# Patient Record
Sex: FEMALE | Race: WHITE | NOT HISPANIC OR LATINO | URBAN - METROPOLITAN AREA
[De-identification: names, ages, dates, MRNs, and addresses within clinical notes are randomized per-mention and may not be internally consistent; named-entity substitution may affect disease eponyms.]

---

## 2017-03-28 ENCOUNTER — OUTPATIENT (OUTPATIENT)
Dept: OUTPATIENT SERVICES | Facility: HOSPITAL | Age: 47
LOS: 1 days | End: 2017-03-28

## 2025-03-17 NOTE — ASU PATIENT PROFILE, ADULT - NSICDXPASTSURGICALHX_GEN_ALL_CORE_FT
PAST SURGICAL HISTORY:  H/O knee surgery     H/O laparoscopic adjustable gastric banding     History of removal of laparoscopic gastric banding device

## 2025-03-17 NOTE — ASU PATIENT PROFILE, ADULT - NSICDXPASTMEDICALHX_GEN_ALL_CORE_FT
PAST MEDICAL HISTORY:  Herniated cervical disc     Hyperlipidemia     Hypothyroid     Rheumatoid arthritis

## 2025-03-17 NOTE — ASU PATIENT PROFILE, ADULT - CAREGIVER
Onset:  1700 last night    Location/Description:  States she is having Wagoner Gonzalez contractions since yesterday around 1700. States she cannot time them because they are inconsistent. No abd pain. Had back pain while lying down and that has gone away. No vag bleeding or leakage of fluid.  +vaginal d/c that has increased since yesterday and is white in color. No fever. No hand or face edema. +fetal movement. Advised to FU within the next 1-2 days. Encouraged water and rest and to star counting Lexie Shad for FU. Verbalizes understanding care advice and to call back with further questions/concerns or if any of the above s/sx develop. Pain Scale (1-10), 10 highest:  0    LMP: Patient's last menstrual period was 2018 (exact date).   EDC:  3/15/19  Gestational Age:  34w5d  Blood Type: B Pos  OB History:   Obstetric History       T1      L1     SAB0   TAB0   Ectopic0   Molar0   Multiple0   Live Births1            Vaginal/C Section:  Plan C Section  Group B Strep (pos or neg):  na  History of previous Labor & Delivery:  CS-LTran  Recent Care:  19  OBGYN    Reason for Disposition  â¢ Increase in vaginal discharge    Protocols used: PREGNANCY - LABOR - [id number] Yes

## 2025-03-18 ENCOUNTER — INPATIENT (INPATIENT)
Facility: HOSPITAL | Age: 55
LOS: 0 days | Discharge: HOME | End: 2025-03-19
Attending: STUDENT IN AN ORGANIZED HEALTH CARE EDUCATION/TRAINING PROGRAM | Admitting: STUDENT IN AN ORGANIZED HEALTH CARE EDUCATION/TRAINING PROGRAM
Payer: COMMERCIAL

## 2025-03-18 VITALS
HEIGHT: 62 IN | RESPIRATION RATE: 16 BRPM | OXYGEN SATURATION: 100 % | HEART RATE: 57 BPM | WEIGHT: 149.03 LBS | SYSTOLIC BLOOD PRESSURE: 124 MMHG | DIASTOLIC BLOOD PRESSURE: 79 MMHG | TEMPERATURE: 98 F

## 2025-03-18 DIAGNOSIS — Z98.890 OTHER SPECIFIED POSTPROCEDURAL STATES: Chronic | ICD-10-CM

## 2025-03-18 DIAGNOSIS — Z98.84 BARIATRIC SURGERY STATUS: Chronic | ICD-10-CM

## 2025-03-18 DIAGNOSIS — Z41.1 ENCOUNTER FOR COSMETIC SURGERY: ICD-10-CM

## 2025-03-18 LAB
HCT VFR BLD CALC: 35.7 % — SIGNIFICANT CHANGE UP (ref 34.5–45)
HGB BLD-MCNC: 12.4 G/DL — SIGNIFICANT CHANGE UP (ref 11.5–15.5)
MCHC RBC-ENTMCNC: 30.5 PG — SIGNIFICANT CHANGE UP (ref 27–34)
MCHC RBC-ENTMCNC: 34.7 G/DL — SIGNIFICANT CHANGE UP (ref 32–36)
MCV RBC AUTO: 87.9 FL — SIGNIFICANT CHANGE UP (ref 80–100)
NRBC # BLD AUTO: 0 K/UL — SIGNIFICANT CHANGE UP (ref 0–0)
NRBC # FLD: 0 K/UL — SIGNIFICANT CHANGE UP (ref 0–0)
NRBC BLD AUTO-RTO: 0 /100 WBCS — SIGNIFICANT CHANGE UP (ref 0–0)
PLATELET # BLD AUTO: 172 K/UL — SIGNIFICANT CHANGE UP (ref 150–400)
RBC # BLD: 4.06 M/UL — SIGNIFICANT CHANGE UP (ref 3.8–5.2)
RBC # FLD: 12.3 % — SIGNIFICANT CHANGE UP (ref 10.3–14.5)
WBC # BLD: 3.65 K/UL — LOW (ref 3.8–10.5)
WBC # FLD AUTO: 3.65 K/UL — LOW (ref 3.8–10.5)

## 2025-03-18 PROCEDURE — 88304 TISSUE EXAM BY PATHOLOGIST: CPT | Mod: 26

## 2025-03-18 RX ORDER — ONDANSETRON HCL/PF 4 MG/2 ML
4 VIAL (ML) INJECTION ONCE
Refills: 0 | Status: DISCONTINUED | OUTPATIENT
Start: 2025-03-18 | End: 2025-03-18

## 2025-03-18 RX ORDER — DIPHENHYDRAMINE HCL 12.5MG/5ML
25 ELIXIR ORAL EVERY 4 HOURS
Refills: 0 | Status: DISCONTINUED | OUTPATIENT
Start: 2025-03-18 | End: 2025-03-19

## 2025-03-18 RX ORDER — ORAL SEMAGLUTIDE 14 MG/1
2 TABLET ORAL
Refills: 0 | DISCHARGE

## 2025-03-18 RX ORDER — MELATONIN 5 MG
3 TABLET ORAL AT BEDTIME
Refills: 0 | Status: DISCONTINUED | OUTPATIENT
Start: 2025-03-18 | End: 2025-03-19

## 2025-03-18 RX ORDER — ORAL SEMAGLUTIDE 14 MG/1
0.5 TABLET ORAL
Refills: 0 | DISCHARGE

## 2025-03-18 RX ORDER — OXYCODONE HYDROCHLORIDE 30 MG/1
5 TABLET ORAL ONCE
Refills: 0 | Status: DISCONTINUED | OUTPATIENT
Start: 2025-03-18 | End: 2025-03-18

## 2025-03-18 RX ORDER — HYDROMORPHONE/SOD CHLOR,ISO/PF 2 MG/10 ML
0.5 SYRINGE (ML) INJECTION
Refills: 0 | Status: DISCONTINUED | OUTPATIENT
Start: 2025-03-18 | End: 2025-03-18

## 2025-03-18 RX ORDER — OXYCODONE HYDROCHLORIDE 30 MG/1
5 TABLET ORAL EVERY 4 HOURS
Refills: 0 | Status: DISCONTINUED | OUTPATIENT
Start: 2025-03-18 | End: 2025-03-19

## 2025-03-18 RX ORDER — ACETAMINOPHEN 500 MG/5ML
650 LIQUID (ML) ORAL EVERY 6 HOURS
Refills: 0 | Status: DISCONTINUED | OUTPATIENT
Start: 2025-03-18 | End: 2025-03-19

## 2025-03-18 RX ORDER — HYPROMELLOSE 0.4 %
1 DROPS OPHTHALMIC (EYE)
Refills: 0 | Status: DISCONTINUED | OUTPATIENT
Start: 2025-03-18 | End: 2025-03-19

## 2025-03-18 RX ORDER — ONDANSETRON HCL/PF 4 MG/2 ML
4 VIAL (ML) INJECTION EVERY 6 HOURS
Refills: 0 | Status: DISCONTINUED | OUTPATIENT
Start: 2025-03-18 | End: 2025-03-19

## 2025-03-18 RX ORDER — SENNA 187 MG
2 TABLET ORAL AT BEDTIME
Refills: 0 | Status: DISCONTINUED | OUTPATIENT
Start: 2025-03-18 | End: 2025-03-19

## 2025-03-18 RX ORDER — HYDROMORPHONE/SOD CHLOR,ISO/PF 2 MG/10 ML
0.5 SYRINGE (ML) INJECTION EVERY 4 HOURS
Refills: 0 | Status: DISCONTINUED | OUTPATIENT
Start: 2025-03-18 | End: 2025-03-19

## 2025-03-18 RX ORDER — LEVOTHYROXINE SODIUM 300 MCG
75 TABLET ORAL DAILY
Refills: 0 | Status: DISCONTINUED | OUTPATIENT
Start: 2025-03-19 | End: 2025-03-19

## 2025-03-18 RX ORDER — ATORVASTATIN CALCIUM 80 MG/1
20 TABLET, FILM COATED ORAL AT BEDTIME
Refills: 0 | Status: DISCONTINUED | OUTPATIENT
Start: 2025-03-18 | End: 2025-03-19

## 2025-03-18 RX ORDER — CEFAZOLIN SODIUM IN 0.9 % NACL 3 G/100 ML
1000 INTRAVENOUS SOLUTION, PIGGYBACK (ML) INTRAVENOUS EVERY 8 HOURS
Refills: 0 | Status: COMPLETED | OUTPATIENT
Start: 2025-03-18 | End: 2025-03-19

## 2025-03-18 RX ORDER — CALCIUM CARBONATE 750 MG/1
1 TABLET ORAL EVERY 4 HOURS
Refills: 0 | Status: DISCONTINUED | OUTPATIENT
Start: 2025-03-18 | End: 2025-03-19

## 2025-03-18 RX ORDER — ENOXAPARIN SODIUM 100 MG/ML
40 INJECTION SUBCUTANEOUS EVERY 24 HOURS
Refills: 0 | Status: DISCONTINUED | OUTPATIENT
Start: 2025-03-19 | End: 2025-03-19

## 2025-03-18 RX ORDER — SODIUM CHLORIDE 9 G/1000ML
1000 INJECTION, SOLUTION INTRAVENOUS
Refills: 0 | Status: DISCONTINUED | OUTPATIENT
Start: 2025-03-18 | End: 2025-03-19

## 2025-03-18 RX ADMIN — Medication 650 MILLIGRAM(S): at 23:56

## 2025-03-18 RX ADMIN — SODIUM CHLORIDE 30 MILLILITER(S): 9 INJECTION, SOLUTION INTRAVENOUS at 22:36

## 2025-03-18 RX ADMIN — Medication 0.5 MILLIGRAM(S): at 20:01

## 2025-03-18 RX ADMIN — Medication 0.5 MILLIGRAM(S): at 20:35

## 2025-03-18 RX ADMIN — OXYCODONE HYDROCHLORIDE 5 MILLIGRAM(S): 30 TABLET ORAL at 21:01

## 2025-03-18 RX ADMIN — Medication 650 MILLIGRAM(S): at 22:56

## 2025-03-18 RX ADMIN — Medication 100 MILLIGRAM(S): at 22:36

## 2025-03-18 RX ADMIN — Medication 2 TABLET(S): at 22:35

## 2025-03-18 RX ADMIN — OXYCODONE HYDROCHLORIDE 5 MILLIGRAM(S): 30 TABLET ORAL at 21:45

## 2025-03-18 RX ADMIN — ATORVASTATIN CALCIUM 20 MILLIGRAM(S): 80 TABLET, FILM COATED ORAL at 22:35

## 2025-03-18 RX ADMIN — Medication 0.5 MILLIGRAM(S): at 20:40

## 2025-03-18 RX ADMIN — Medication 0.5 MILLIGRAM(S): at 21:00

## 2025-03-18 RX ADMIN — CALCIUM CARBONATE 1 TABLET(S): 750 TABLET ORAL at 22:56

## 2025-03-18 NOTE — BRIEF OPERATIVE NOTE - NSICDXBRIEFPROCEDURE_GEN_ALL_CORE_FT
PROCEDURES:  Abdominoplasty with liposuction 18-Mar-2025 19:20:54  Radha Granger  Creation of recipient site by excision of scar of trunk 18-Mar-2025 19:21:01  Radha Granger  Liposuction, chin, cosmetic 18-Mar-2025 19:21:19  Radha Granger  Liposuction of abdomen and both thighs 18-Mar-2025 19:21:26  Radha Granger

## 2025-03-18 NOTE — PACU DISCHARGE NOTE - COMMENTS
Discharge from Anesthesia surveillance once criteria met and documentation of post-anesthesia evaluation complete. No anesthesia complications. Continuous pulse oximetry.

## 2025-03-18 NOTE — BRIEF OPERATIVE NOTE - NSICDXBRIEFPREOP_GEN_ALL_CORE_FT
PRE-OP DIAGNOSIS:  Diastasis recti 18-Mar-2025 19:20:06  Radha Granger  Lipodystrophy 18-Mar-2025 19:20:22  Radha Granger  Scar of breast 18-Mar-2025 19:19:51  Radha Granger

## 2025-03-18 NOTE — H&P ADULT - ASSESSMENT
A/P:     -NPO  -IVF  -Informed consent obtained   -Pre op protocol initiated  -Post op care to follow

## 2025-03-18 NOTE — PROVIDER CONTACT NOTE (OTHER) - ACTION/TREATMENT ORDERED:
PACU PA made aware. Surgical resident made aware, as per resident this is normal and ok for patient to be transferred to 4T.  No other interventions ordered at this time. 4T RN made aware.

## 2025-03-18 NOTE — BRIEF OPERATIVE NOTE - NSICDXBRIEFPOSTOP_GEN_ALL_CORE_FT
POST-OP DIAGNOSIS:  Scar of breast 18-Mar-2025 19:19:48  Radha Granger  Diastasis recti 18-Mar-2025 19:20:01  Radha Granger  Lipodystrophy 18-Mar-2025 19:20:15  Rdaha Granger

## 2025-03-18 NOTE — H&P ADULT - HISTORY OF PRESENT ILLNESS
54 year old female presents today for bilateral breast scar revision, abdominal scar revision with diastasis repair, lipo to bilateral flanks and back, lipo to bilateral upper thighs, lipo to chin.

## 2025-03-18 NOTE — PROVIDER CONTACT NOTE (OTHER) - ASSESSMENT
Patient status post Abdominoplasty. Right abdominal SONIA output 144ml within 4 hours. Patient hemodynamically stable. VSS. Patient safety and comfort maintained.

## 2025-03-18 NOTE — H&P ADULT - NSHPPHYSICALEXAM_GEN_ALL_CORE
T(C): 36.8 (03-18-25 @ 11:06), Max: 36.8 (03-18-25 @ 11:06)  HR: 57 (03-18-25 @ 11:06) (57 - 57)  BP: 124/79 (03-18-25 @ 11:06) (124/79 - 124/79)  RR: 16 (03-18-25 @ 11:06) (16 - 16)  SpO2: 100% (03-18-25 @ 11:06) (100% - 100%)    CONSTITUTIONAL: Well groomed, no apparent distress  EYES: PERRLA and symmetric, EOMI, No conjunctival or scleral injection, non-icteric  ENMT: Oral mucosa with moist membranes. Normal dentition; no pharyngeal injection or exudates             NECK: Supple, symmetric and without tracheal deviation   RESP: No respiratory distress, no use of accessory muscles; CTA b/l, no WRR  CV: RRR, +S1S2, no MRG; no JVD; no peripheral edema  GI: Soft, NT, ND, no rebound, no guarding; no palpable masses; no hepatosplenomegaly; no hernia palpated  LYMPH: No cervical LAD or tenderness; no axillary LAD or tenderness; no inguinal LAD or tenderness  MSK: Normal gait; No digital clubbing or cyanosis; examination of the (head/neck/spine/ribs/pelvis, RUE, LUE, RLE, LLE) without misalignment,            Normal ROM without pain, no spinal tenderness, normal muscle strength/tone  SKIN: No rashes or ulcers noted; no subcutaneous nodules or induration palpable  NEURO: CN II-XII intact; normal reflexes in upper and lower extremities, sensation intact in upper and lower extremities b/l to light touch   PSYCH: Appropriate insight/judgment; A+O x 3, mood and affect appropriate, recent/remote memory intact

## 2025-03-18 NOTE — ASU PREOP CHECKLIST - PATIENT SENT TO
operating room Eye Protection Verbiage: Before proceeding with the stage, a plastic scleral shield was inserted. The globe was anesthetized with a few drops of 1% lidocaine with 1:100,000 epinephrine. Then, an appropriate sized scleral shield was chosen and coated with lacrilube ointment. The shield was gently inserted and left in place for the duration of each stage. After the stage was completed, the shield was gently removed.

## 2025-03-19 VITALS
SYSTOLIC BLOOD PRESSURE: 105 MMHG | OXYGEN SATURATION: 97 % | TEMPERATURE: 99 F | HEART RATE: 73 BPM | DIASTOLIC BLOOD PRESSURE: 54 MMHG | RESPIRATION RATE: 19 BRPM

## 2025-03-19 LAB
ALBUMIN SERPL ELPH-MCNC: 3.6 G/DL — SIGNIFICANT CHANGE UP (ref 3.3–5)
ALP SERPL-CCNC: 49 U/L — SIGNIFICANT CHANGE UP (ref 40–120)
ALT FLD-CCNC: 23 U/L — SIGNIFICANT CHANGE UP (ref 4–33)
AST SERPL-CCNC: 37 U/L — HIGH (ref 4–32)
BUN SERPL-MCNC: 18 MG/DL — SIGNIFICANT CHANGE UP (ref 7–23)
CALCIUM SERPL-MCNC: 8.8 MG/DL — SIGNIFICANT CHANGE UP (ref 8.4–10.5)
CO2 SERPL-SCNC: 25 MMOL/L — SIGNIFICANT CHANGE UP (ref 22–31)
CREAT SERPL-MCNC: 0.75 MG/DL — SIGNIFICANT CHANGE UP (ref 0.5–1.3)
EGFR: 94 ML/MIN/1.73M2 — SIGNIFICANT CHANGE UP
EGFR: 94 ML/MIN/1.73M2 — SIGNIFICANT CHANGE UP
GLUCOSE SERPL-MCNC: 191 MG/DL — HIGH (ref 70–99)
HCT VFR BLD CALC: 28.4 % — LOW (ref 34.5–45)
HGB BLD-MCNC: 9.7 G/DL — LOW (ref 11.5–15.5)
MCHC RBC-ENTMCNC: 30.4 PG — SIGNIFICANT CHANGE UP (ref 27–34)
MCHC RBC-ENTMCNC: 34.2 G/DL — SIGNIFICANT CHANGE UP (ref 32–36)
MCV RBC AUTO: 89 FL — SIGNIFICANT CHANGE UP (ref 80–100)
NRBC # BLD AUTO: 0 K/UL — SIGNIFICANT CHANGE UP (ref 0–0)
PLATELET # BLD AUTO: 182 K/UL — SIGNIFICANT CHANGE UP (ref 150–400)
POTASSIUM SERPL-MCNC: 4.1 MMOL/L — SIGNIFICANT CHANGE UP (ref 3.5–5.3)
POTASSIUM SERPL-SCNC: 4.1 MMOL/L — SIGNIFICANT CHANGE UP (ref 3.5–5.3)
PROT SERPL-MCNC: 5.6 G/DL — LOW (ref 6–8.3)
RBC # BLD: 3.19 M/UL — LOW (ref 3.8–5.2)
RBC # FLD: 12.5 % — SIGNIFICANT CHANGE UP (ref 10.3–14.5)
SODIUM SERPL-SCNC: 138 MMOL/L — SIGNIFICANT CHANGE UP (ref 135–145)
WBC # BLD: 6.18 K/UL — SIGNIFICANT CHANGE UP (ref 3.8–10.5)

## 2025-03-19 RX ORDER — ATORVASTATIN CALCIUM 80 MG/1
1 TABLET, FILM COATED ORAL
Qty: 0 | Refills: 0 | DISCHARGE

## 2025-03-19 RX ORDER — LEVOTHYROXINE SODIUM 300 MCG
1 TABLET ORAL
Qty: 0 | Refills: 0 | DISCHARGE

## 2025-03-19 RX ADMIN — OXYCODONE HYDROCHLORIDE 5 MILLIGRAM(S): 30 TABLET ORAL at 02:38

## 2025-03-19 RX ADMIN — Medication 100 MILLIGRAM(S): at 05:36

## 2025-03-19 RX ADMIN — OXYCODONE HYDROCHLORIDE 5 MILLIGRAM(S): 30 TABLET ORAL at 10:34

## 2025-03-19 RX ADMIN — CALCIUM CARBONATE 1 TABLET(S): 750 TABLET ORAL at 10:38

## 2025-03-19 RX ADMIN — OXYCODONE HYDROCHLORIDE 5 MILLIGRAM(S): 30 TABLET ORAL at 16:18

## 2025-03-19 RX ADMIN — OXYCODONE HYDROCHLORIDE 5 MILLIGRAM(S): 30 TABLET ORAL at 11:30

## 2025-03-19 RX ADMIN — OXYCODONE HYDROCHLORIDE 5 MILLIGRAM(S): 30 TABLET ORAL at 05:35

## 2025-03-19 RX ADMIN — OXYCODONE HYDROCHLORIDE 5 MILLIGRAM(S): 30 TABLET ORAL at 15:18

## 2025-03-19 RX ADMIN — OXYCODONE HYDROCHLORIDE 5 MILLIGRAM(S): 30 TABLET ORAL at 01:38

## 2025-03-19 RX ADMIN — Medication 75 MICROGRAM(S): at 05:36

## 2025-03-19 RX ADMIN — OXYCODONE HYDROCHLORIDE 5 MILLIGRAM(S): 30 TABLET ORAL at 06:35

## 2025-03-19 RX ADMIN — ENOXAPARIN SODIUM 40 MILLIGRAM(S): 100 INJECTION SUBCUTANEOUS at 10:39

## 2025-03-19 NOTE — PROGRESS NOTE ADULT - ASSESSMENT
s/p b/l breast scar revision, abdominoplasty with lipo to b/l flanks, backs, medial thighs, and under chin on 3/18/25. Recovering well.     Plan:  -Lovenox today  -Keep in flexed position  -f/u AM labs  -pain control   -dc today if AM labs stable

## 2025-03-19 NOTE — PATIENT PROFILE ADULT - FALL HARM RISK - RISK INTERVENTIONS
Assistance OOB with selected safe patient handling equipment/Assistance with ambulation/Communicate Fall Risk and Risk Factors to all staff, patient, and family/Monitor gait and stability/Reinforce activity limits and safety measures with patient and family/Sit up slowly, dangle for a short time, stand at bedside before walking/Use of alarms - bed, chair and/or voice tab/Visual Cue: Yellow wristband/Bed in lowest position, wheels locked, appropriate side rails in place/Call bell, personal items and telephone in reach/Instruct patient to call for assistance before getting out of bed or chair/Non-slip footwear when patient is out of bed/[place] to call system/Physically safe environment - no spills, clutter or unnecessary equipment/Purposeful Proactive Rounding/Room/bathroom lighting operational, light cord in reach

## 2025-03-19 NOTE — DISCHARGE NOTE PROVIDER - CARE PROVIDER_API CALL
Natalia Wen  Plastic Surgery  [address], [address]  [place] [address]  Phone: [phone]  Fax: [phone]  Follow Up Time:

## 2025-03-19 NOTE — DISCHARGE NOTE NURSING/CASE MANAGEMENT/SOCIAL WORK - FINANCIAL ASSISTANCE
[facility] provides services at a reduced cost to those who are determined to be eligible through [facility]’s financial assistance program. Information regarding [facility]’s financial assistance program can be found by going to [email or web address] or by calling [phone].

## 2025-03-19 NOTE — PROGRESS NOTE ADULT - SUBJECTIVE AND OBJECTIVE BOX
Plastic Surgery Progress Note (pg [facility]: [id number], [facility]: [phone])    SUBJECTIVE  The patient was seen and examined. No acute events overnight. Pain controlled, afebrile w/ stable vitals.     OBJECTIVE  ___________________________________________________  VITAL SIGNS / I&O's   Vital Signs Last 24 Hrs  T(C): 36.4 (19 Mar 2025 05:25), Max: 37.1 (18 Mar 2025 20:00)  T(F): 97.6 (19 Mar 2025 05:25), Max: 98.8 (18 Mar 2025 20:00)  HR: 72 (19 Mar 2025 05:25) (57 - 98)  BP: 116/55 (19 Mar 2025 05:25) (100/51 - 139/75)  BP(mean): 91 (18 Mar 2025 22:56) (67 - 91)  RR: 17 (19 Mar 2025 05:25) (12 - 20)  SpO2: 100% (19 Mar 2025 05:25) (88% - 100%)    Parameters below as of 19 Mar 2025 05:25  Patient On (Oxygen Delivery Method): room air          18 Mar 2025 07:01  -  19 Mar 2025 07:00  --------------------------------------------------------  IN:    Lactated Ringers: 150 mL    Oral Fluid: 480 mL  Total IN: 630 mL    OUT:    Bulb (mL): 170.5 mL    Bulb (mL): 172 mL    Voided (mL): 350 mL  Total OUT: 692.5 mL    Total NET: -62.5 mL        ___________________________________________________  PHYSICAL EXAM  Verbal consent obtained prior to exam.    -- CONSTITUTIONAL: NAD, lying in bed  --BREAST: incision c/d/i  -- ABDOMEN: Nondistended, drains in place ss, incisions c/d/i  -- EXTREMITIES: incisions c/d/i Warm and well perfused      ___________________________________________________  LABS                        12.4   3.65  )-----------( 172      ( 18 Mar 2025 12:00 )             35.7     19 Mar 2025 05:25    138    |  103    |  18     ----------------------------<  191    4.1     |  25     |  0.75     Ca    8.8        19 Mar 2025 05:25    TPro  5.6    /  Alb  3.6    /  TBili  0.7    /  DBili  x      /  AST  37     /  ALT  23     /  AlkPhos  49     19 Mar 2025 05:25      CAPILLARY BLOOD GLUCOSE            Urinalysis Basic - ( 19 Mar 2025 05:25 )    Color: x / Appearance: x / SG: x / pH: x  Gluc: 191 mg/dL / Ketone: x  / Bili: x / Urobili: x   Blood: x / Protein: x / Nitrite: x   Leuk Esterase: x / RBC: x / WBC x   Sq Epi: x / Non Sq Epi: x / Bacteria: x      ___________________________________________________  MICRO  Recent Cultures:    ___________________________________________________  MEDICATIONS  (STANDING):  atorvastatin 20 milliGRAM(s) Oral at bedtime  ceFAZolin   IVPB 1000 milliGRAM(s) IV Intermittent every 8 hours  enoxaparin Injectable 40 milliGRAM(s) SubCutaneous every 24 hours  lactated ringers. 1000 milliLiter(s) (30 mL/Hr) IV Continuous <Continuous>  levothyroxine 75 MICROGram(s) Oral daily  senna 2 Tablet(s) Oral at bedtime    MEDICATIONS  (PRN):  acetaminophen     Tablet .. 650 milliGRAM(s) Oral every 6 hours PRN Mild Pain (1 - 3)  artificial tears (preservative free) Ophthalmic Solution 1 Drop(s) Both EYES every 1 hour PRN Dry Eyes  benzocaine/menthol Lozenge 1 Lozenge Oral every 3 hours PRN Sore Throat  calcium carbonate    500 mG (Tums) Chewable 1 Tablet(s) Chew every 4 hours PRN Dyspepsia  diphenhydrAMINE 25 milliGRAM(s) Oral every 4 hours PRN Itching  HYDROmorphone  Injectable 0.5 milliGRAM(s) IV Push every 4 hours PRN Severe Pain (7 - 10)  melatonin 3 milliGRAM(s) Oral at bedtime PRN Insomnia  ondansetron Injectable 4 milliGRAM(s) IV Push every 6 hours PRN Nausea and/or Vomiting  oxyCODONE    IR 5 milliGRAM(s) Oral every 4 hours PRN Moderate Pain (4 - 6)

## 2025-03-19 NOTE — DISCHARGE NOTE NURSING/CASE MANAGEMENT/SOCIAL WORK - NSDCPEFALRISK_GEN_ALL_CORE
For information on Fall & Injury Prevention, visit: [email or web address] OR  [email or web address] OR  https://www.cdc.gov/steadi/patient.html

## 2025-03-19 NOTE — DISCHARGE NOTE PROVIDER - NSDCMRMEDTOKEN_GEN_ALL_CORE_FT
atorvastatin 20 mg oral tablet: 1 tab(s) orally once a day  Ozempic 2 mg/1.5 mL (0.25 mg or 0.5 mg dose) subcutaneous solution: 0.5 milligram(s) subcutaneously  Ozempic 2 mg/1.5 mL (1 mg dose) subcutaneous solution: 2 subcutaneous  Synthroid 75 mcg (0.075 mg) oral tablet: 1 tab(s) orally once a day

## 2025-03-19 NOTE — DISCHARGE NOTE NURSING/CASE MANAGEMENT/SOCIAL WORK - PATIENT PORTAL LINK FT
You can access the FollowMyHealth Patient Portal offered by [facility] by registering at the following website: [email or web address]. By joining [facility]’s FollowMyHealth portal, you will also be able to view your health information using other applications (apps) compatible with our system.

## 2025-03-19 NOTE — DISCHARGE NOTE PROVIDER - HOSPITAL COURSE
s/p bilateral breast scar revision, abdominal scar revision with diastasis repair, lipo to bilateral flanks and back, lipo to bilateral upper thighs, lipo to chin on 3/18/25. Recovering appropriately.     Stable for DC today.   Please follow post-operative/wound care instructions provided by Dr. Thompson/Dr. Wen.   All medications have been sent to your home pharmacy.

## 2025-03-24 LAB — SURGICAL PATHOLOGY STUDY: SIGNIFICANT CHANGE UP

## 2025-04-08 ENCOUNTER — EMERGENCY (EMERGENCY)
Facility: HOSPITAL | Age: 55
LOS: 1 days | End: 2025-04-08
Attending: EMERGENCY MEDICINE | Admitting: EMERGENCY MEDICINE
Payer: COMMERCIAL

## 2025-04-08 VITALS
SYSTOLIC BLOOD PRESSURE: 130 MMHG | OXYGEN SATURATION: 98 % | DIASTOLIC BLOOD PRESSURE: 69 MMHG | RESPIRATION RATE: 18 BRPM | TEMPERATURE: 99 F | HEART RATE: 115 BPM | WEIGHT: 145.06 LBS | HEIGHT: 62 IN

## 2025-04-08 DIAGNOSIS — Z98.84 BARIATRIC SURGERY STATUS: Chronic | ICD-10-CM

## 2025-04-08 DIAGNOSIS — Z98.890 OTHER SPECIFIED POSTPROCEDURAL STATES: Chronic | ICD-10-CM

## 2025-04-08 PROBLEM — M50.20 OTHER CERVICAL DISC DISPLACEMENT, UNSPECIFIED CERVICAL REGION: Chronic | Status: ACTIVE | Noted: 2025-03-18

## 2025-04-08 PROBLEM — E78.5 HYPERLIPIDEMIA, UNSPECIFIED: Chronic | Status: ACTIVE | Noted: 2025-03-18

## 2025-04-08 PROBLEM — E03.9 HYPOTHYROIDISM, UNSPECIFIED: Chronic | Status: ACTIVE | Noted: 2025-03-18

## 2025-04-08 LAB
ADD ON TEST-SPECIMEN IN LAB: SIGNIFICANT CHANGE UP
ALBUMIN SERPL ELPH-MCNC: 3.3 G/DL — SIGNIFICANT CHANGE UP (ref 3.3–5)
ALP SERPL-CCNC: 217 U/L — HIGH (ref 40–120)
ALT FLD-CCNC: 58 U/L — HIGH (ref 4–33)
ANION GAP SERPL CALC-SCNC: 13 MMOL/L — SIGNIFICANT CHANGE UP (ref 7–14)
APPEARANCE UR: CLEAR — SIGNIFICANT CHANGE UP
APTT BLD: 31.1 SEC — SIGNIFICANT CHANGE UP (ref 24.5–35.6)
AST SERPL-CCNC: 56 U/L — HIGH (ref 4–32)
BASOPHILS # BLD AUTO: 0.02 K/UL — SIGNIFICANT CHANGE UP (ref 0–0.2)
BASOPHILS NFR BLD AUTO: 0.4 % — SIGNIFICANT CHANGE UP (ref 0–2)
BILIRUB SERPL-MCNC: 0.3 MG/DL — SIGNIFICANT CHANGE UP (ref 0.2–1.2)
BILIRUB UR-MCNC: NEGATIVE — SIGNIFICANT CHANGE UP
BLOOD GAS VENOUS COMPREHENSIVE RESULT: SIGNIFICANT CHANGE UP
BUN SERPL-MCNC: 16 MG/DL — SIGNIFICANT CHANGE UP (ref 7–23)
CALCIUM SERPL-MCNC: 9.4 MG/DL — SIGNIFICANT CHANGE UP (ref 8.4–10.5)
CHLORIDE SERPL-SCNC: 99 MMOL/L — SIGNIFICANT CHANGE UP (ref 98–107)
CO2 SERPL-SCNC: 24 MMOL/L — SIGNIFICANT CHANGE UP (ref 22–31)
COLOR SPEC: YELLOW — SIGNIFICANT CHANGE UP
CREAT SERPL-MCNC: 0.65 MG/DL — SIGNIFICANT CHANGE UP (ref 0.5–1.3)
DIFF PNL FLD: NEGATIVE — SIGNIFICANT CHANGE UP
EGFR: 104 ML/MIN/1.73M2 — SIGNIFICANT CHANGE UP
EGFR: 104 ML/MIN/1.73M2 — SIGNIFICANT CHANGE UP
EOSINOPHIL # BLD AUTO: 0.18 K/UL — SIGNIFICANT CHANGE UP (ref 0–0.5)
EOSINOPHIL NFR BLD AUTO: 3.3 % — SIGNIFICANT CHANGE UP (ref 0–6)
FLUAV AG NPH QL: SIGNIFICANT CHANGE UP
FLUBV AG NPH QL: SIGNIFICANT CHANGE UP
GLUCOSE SERPL-MCNC: 112 MG/DL — HIGH (ref 70–99)
GLUCOSE UR QL: NEGATIVE MG/DL — SIGNIFICANT CHANGE UP
HCG SERPL-ACNC: 2.8 MIU/ML — SIGNIFICANT CHANGE UP
HCT VFR BLD CALC: 30.1 % — LOW (ref 34.5–45)
HGB BLD-MCNC: 9.9 G/DL — LOW (ref 11.5–15.5)
IANC: 4.33 K/UL — SIGNIFICANT CHANGE UP (ref 1.8–7.4)
IMM GRANULOCYTES NFR BLD AUTO: 1.6 % — HIGH (ref 0–0.9)
INR BLD: 1.08 RATIO — SIGNIFICANT CHANGE UP (ref 0.85–1.16)
KETONES UR-MCNC: NEGATIVE MG/DL — SIGNIFICANT CHANGE UP
LEUKOCYTE ESTERASE UR-ACNC: NEGATIVE — SIGNIFICANT CHANGE UP
LYMPHOCYTES # BLD AUTO: 0.42 K/UL — LOW (ref 1–3.3)
LYMPHOCYTES # BLD AUTO: 7.7 % — LOW (ref 13–44)
MCHC RBC-ENTMCNC: 29.6 PG — SIGNIFICANT CHANGE UP (ref 27–34)
MCHC RBC-ENTMCNC: 32.9 G/DL — SIGNIFICANT CHANGE UP (ref 32–36)
MCV RBC AUTO: 90.1 FL — SIGNIFICANT CHANGE UP (ref 80–100)
MONOCYTES # BLD AUTO: 0.43 K/UL — SIGNIFICANT CHANGE UP (ref 0–0.9)
MONOCYTES NFR BLD AUTO: 7.9 % — SIGNIFICANT CHANGE UP (ref 2–14)
NEUTROPHILS # BLD AUTO: 4.33 K/UL — SIGNIFICANT CHANGE UP (ref 1.8–7.4)
NEUTROPHILS NFR BLD AUTO: 79.1 % — HIGH (ref 43–77)
NITRITE UR-MCNC: NEGATIVE — SIGNIFICANT CHANGE UP
NRBC # BLD AUTO: 0 K/UL — SIGNIFICANT CHANGE UP (ref 0–0)
NRBC # FLD: 0 K/UL — SIGNIFICANT CHANGE UP (ref 0–0)
NRBC BLD AUTO-RTO: 0 /100 WBCS — SIGNIFICANT CHANGE UP (ref 0–0)
PH UR: 6 — SIGNIFICANT CHANGE UP (ref 5–8)
PLATELET # BLD AUTO: 222 K/UL — SIGNIFICANT CHANGE UP (ref 150–400)
POTASSIUM SERPL-MCNC: 3.6 MMOL/L — SIGNIFICANT CHANGE UP (ref 3.5–5.3)
POTASSIUM SERPL-SCNC: 3.6 MMOL/L — SIGNIFICANT CHANGE UP (ref 3.5–5.3)
PROT SERPL-MCNC: 6.5 G/DL — SIGNIFICANT CHANGE UP (ref 6–8.3)
PROT UR-MCNC: NEGATIVE MG/DL — SIGNIFICANT CHANGE UP
PROTHROM AB SERPL-ACNC: 12.9 SEC — SIGNIFICANT CHANGE UP (ref 9.9–13.4)
RBC # BLD: 3.34 M/UL — LOW (ref 3.8–5.2)
RBC # FLD: 13.4 % — SIGNIFICANT CHANGE UP (ref 10.3–14.5)
RSV RNA NPH QL NAA+NON-PROBE: SIGNIFICANT CHANGE UP
SARS-COV-2 RNA SPEC QL NAA+PROBE: SIGNIFICANT CHANGE UP
SODIUM SERPL-SCNC: 136 MMOL/L — SIGNIFICANT CHANGE UP (ref 135–145)
SOURCE RESPIRATORY: SIGNIFICANT CHANGE UP
SP GR SPEC: 1.01 — SIGNIFICANT CHANGE UP (ref 1–1.03)
UROBILINOGEN FLD QL: 0.2 MG/DL — SIGNIFICANT CHANGE UP (ref 0.2–1)
WBC # BLD: 5.47 K/UL — SIGNIFICANT CHANGE UP (ref 3.8–10.5)
WBC # FLD AUTO: 5.47 K/UL — SIGNIFICANT CHANGE UP (ref 3.8–10.5)

## 2025-04-08 PROCEDURE — 70496 CT ANGIOGRAPHY HEAD: CPT | Mod: 26

## 2025-04-08 PROCEDURE — 93010 ELECTROCARDIOGRAM REPORT: CPT

## 2025-04-08 PROCEDURE — 99223 1ST HOSP IP/OBS HIGH 75: CPT

## 2025-04-08 PROCEDURE — 70498 CT ANGIOGRAPHY NECK: CPT | Mod: 26

## 2025-04-08 PROCEDURE — 70450 CT HEAD/BRAIN W/O DYE: CPT | Mod: 26,59

## 2025-04-08 PROCEDURE — 74177 CT ABD & PELVIS W/CONTRAST: CPT | Mod: 26

## 2025-04-08 RX ORDER — VANCOMYCIN HCL IN 5 % DEXTROSE 1.5G/250ML
1000 PLASTIC BAG, INJECTION (ML) INTRAVENOUS EVERY 12 HOURS
Refills: 0 | Status: ACTIVE | OUTPATIENT
Start: 2025-04-08 | End: 2025-04-15

## 2025-04-08 RX ORDER — PIPERACILLIN-TAZO-DEXTROSE,ISO 3.375G/5
3.38 IV SOLUTION, PIGGYBACK PREMIX FROZEN(ML) INTRAVENOUS ONCE
Refills: 0 | Status: COMPLETED | OUTPATIENT
Start: 2025-04-08 | End: 2025-04-08

## 2025-04-08 RX ORDER — METOCLOPRAMIDE HCL 10 MG
10 TABLET ORAL ONCE
Refills: 0 | Status: COMPLETED | OUTPATIENT
Start: 2025-04-08 | End: 2025-04-08

## 2025-04-08 RX ORDER — VANCOMYCIN HCL IN 5 % DEXTROSE 1.5G/250ML
1000 PLASTIC BAG, INJECTION (ML) INTRAVENOUS ONCE
Refills: 0 | Status: COMPLETED | OUTPATIENT
Start: 2025-04-08 | End: 2025-04-08

## 2025-04-08 RX ORDER — ACETAMINOPHEN 500 MG/5ML
975 LIQUID (ML) ORAL ONCE
Refills: 0 | Status: COMPLETED | OUTPATIENT
Start: 2025-04-08 | End: 2025-04-08

## 2025-04-08 RX ORDER — ACETAMINOPHEN 500 MG/5ML
975 LIQUID (ML) ORAL EVERY 6 HOURS
Refills: 0 | Status: ACTIVE | OUTPATIENT
Start: 2025-04-08 | End: 2026-03-07

## 2025-04-08 RX ORDER — PIPERACILLIN-TAZO-DEXTROSE,ISO 3.375G/5
3.38 IV SOLUTION, PIGGYBACK PREMIX FROZEN(ML) INTRAVENOUS EVERY 8 HOURS
Refills: 0 | Status: ACTIVE | OUTPATIENT
Start: 2025-04-08 | End: 2025-04-15

## 2025-04-08 RX ORDER — KETOROLAC TROMETHAMINE 30 MG/ML
15 INJECTION, SOLUTION INTRAMUSCULAR; INTRAVENOUS EVERY 6 HOURS
Refills: 0 | Status: DISCONTINUED | OUTPATIENT
Start: 2025-04-08 | End: 2025-04-08

## 2025-04-08 RX ORDER — VANCOMYCIN HCL IN 5 % DEXTROSE 1.5G/250ML
750 PLASTIC BAG, INJECTION (ML) INTRAVENOUS EVERY 12 HOURS
Refills: 0 | Status: DISCONTINUED | OUTPATIENT
Start: 2025-04-08 | End: 2025-04-08

## 2025-04-08 RX ADMIN — Medication 2000 MILLILITER(S): at 14:39

## 2025-04-08 RX ADMIN — Medication 25 GRAM(S): at 23:52

## 2025-04-08 RX ADMIN — Medication 250 MILLIGRAM(S): at 16:24

## 2025-04-08 RX ADMIN — Medication 200 GRAM(S): at 14:39

## 2025-04-08 RX ADMIN — Medication 975 MILLIGRAM(S): at 17:37

## 2025-04-08 RX ADMIN — Medication 10 MILLIGRAM(S): at 23:52

## 2025-04-08 RX ADMIN — Medication 975 MILLIGRAM(S): at 16:09

## 2025-04-08 RX ADMIN — Medication 10 MILLIGRAM(S): at 16:09

## 2025-04-08 NOTE — ED PROVIDER NOTE - PHYSICAL EXAMINATION
CONSTITUTIONAL: awake; in no acute distress.   SKIN: abd incision with surrounding erythema and warmth, with purulent discharge from midline with dressing/packing in place with purulence.  HEAD: Normocephalic; atraumatic.  EYES: no conjunctival injection. no scleral icterus  ENT: No nasal discharge; airway clear.  CARD: S1, S2 normal; no murmurs, gallops, or rubs. Regular rate and rhythm.   RESP: No wheezes, rales or rhonchi. Good air movement bilaterally.   ABD: soft +Tender about midline lower abd around skin changes, no guarding, no distention, no rigidity.   EXT: Ambulates independently.  No cyanosis or edema.   NEURO: Alert, oriented, grossly unremarkable  PSYCH: Cooperative, appropriate.

## 2025-04-08 NOTE — ED CDU PROVIDER INITIAL DAY NOTE - ATTENDING APP SHARED VISIT CONTRIBUTION OF CARE
I, Case Mock, DO personally saw the patient with FLAVIO.  I have personally performed a face to face diagnostic evaluation on this patient.  I have reviewed the FLAVIO note and agree with the history, exam, and plan of care, except as noted.  I personally saw the patient and performed a substantive portion of the visit including all aspects of the medical decision making.

## 2025-04-08 NOTE — CONSULT NOTE ADULT - SUBJECTIVE AND OBJECTIVE BOX
Plastic Surgery Consult Note  (pg [facility]: [id number], [facility]: [phone])    HPI:  54F Hx HLD, hypothyroidism, RA and repeat abdominoplasty s/p abdominoplasty w/ liposuction of chin, abdomen, thighs, and excision of b/l breast scars on 25, presents with complaints of 5+ days of fever, chills, nausea, decreased appetite, headaches, and drainage from her surgical sites. States she was in her usual state of health until the last abdominal drain was removed about a week ago. Reports her symptoms are improved w/ tylenol. Went to urgent care on  and was started on bactrim and per patient she then saw her surgeon  for the drainage from her wounds where the midline opening was packed. Reports the output has consistently been a yellowish-brown colors, somewhat cloudy, but denies chinmay pus. Noted increased redness around the midline over the past two days. Reports her fevers reached as high as 103F at home. Denies any lightheadedness, palpitations, syncopal episodes, pain around her incisions, dysuria, or diarrhea.     PAST MEDICAL & SURGICAL HISTORY:  Hypothyroid      Hyperlipidemia      Rheumatoid arthritis      Herniated cervical disc      H/O laparoscopic adjustable gastric banding      History of removal of laparoscopic gastric banding device      H/O knee surgery        Allergies    No Known Allergies    Intolerances      Home Medications:  atorvastatin 20 mg oral tablet: 1 tab(s) orally once a day (19 Mar 2025 09:09)  Ozempic 2 mg/1.5 mL (0.25 mg or 0.5 mg dose) subcutaneous solution: 0.5 milligram(s) subcutaneously (18 Mar 2025 18:53)  Ozempic 2 mg/1.5 mL (1 mg dose) subcutaneous solution: 2 subcutaneous (18 Mar 2025 18:53)  Synthroid 75 mcg (0.075 mg) oral tablet: 1 tab(s) orally once a day (19 Mar 2025 09:09)    MEDICATIONS  (STANDING):      SOCIAL HISTORY:  FAMILY HISTORY:  No pertinent family history in first degree relatives        ___________________________________________  OBJECTIVE:  Vital Signs Last 24 Hrs  T(C): 37.3 (2025 16:23), Max: 37.3 (2025 15:05)  T(F): 99.1 (2025 16:23), Max: 99.2 (2025 15:05)  HR: 81 (2025 16:23) (78 - 115)  BP: 110/62 (2025 16:23) (110/62 - 130/69)  BP(mean): --  RR: 16 (2025 16:23) (16 - 18)  SpO2: 96% (2025 16:23) (96% - 98%)    Parameters below as of 2025 15:05  Patient On (Oxygen Delivery Method): room air    CAPILLARY BLOOD GLUCOSE        I&O's Detail    General: Well developed, well nourished, NAD  Neuro: Alert and oriented, no focal deficits, moves all extremities spontaneously  HEENT: NCAT, EOMI, anicteric, mucosa moist  Respiratory: Airway patent, respirations unlabored  CVS: Regular rate and rhythm  Abdomen: Soft, nontender, nondistended. Small 1cm opening below the umbilicus over surgical site w/ expressible yellowish-brown mildly cloudy fluid, no chinmay pus, no fluctuance or crepitus. Large area of cellulitis surrounds this area a few cm laterally on both sides and extended from below umbilicus to the pubic symphysis. Small area of cellulitis over the L pelvis surgical site where previous drain was w/ no expressible drainage, fluctuance, or crepitus.   Extremities: No edema, sensation and movement grossly intact    ____________________________________________  LABS:  CBC Full  -  ( 2025 14:30 )  WBC Count : 5.47 K/uL  RBC Count : 3.34 M/uL  Hemoglobin : 9.9 g/dL  Hematocrit : 30.1 %  Platelet Count - Automated : 222 K/uL  Mean Cell Volume : 90.1 fL  Mean Cell Hemoglobin : 29.6 pg  Mean Cell Hemoglobin Concentration : 32.9 g/dL  Auto Neutrophil # : x  Auto Lymphocyte # : x  Auto Monocyte # : x  Auto Eosinophil # : x  Auto Basophil # : x  Auto Neutrophil % : x  Auto Lymphocyte % : x  Auto Monocyte % : x  Auto Eosinophil % : x  Auto Basophil % : x        136  |  99  |  16  ----------------------------<  112[H]  3.6   |  24  |  0.65    Ca    9.4      2025 14:30    TPro  6.5  /  Alb  3.3  /  TBili  0.3  /  DBili  x   /  AST  56[H]  /  ALT  58[H]  /  AlkPhos  217[H]      LIVER FUNCTIONS - ( 2025 14:30 )  Alb: 3.3 g/dL / Pro: 6.5 g/dL / ALK PHOS: 217 U/L / ALT: 58 U/L / AST: 56 U/L / GGT: x           PT/INR - ( 2025 14:30 )   PT: 12.9 sec;   INR: 1.08 ratio         PTT - ( 2025 14:30 )  PTT:31.1 sec  Urinalysis Basic - ( 2025 14:59 )    Color: Yellow / Appearance: Clear / S.010 / pH: x  Gluc: x / Ketone: Negative mg/dL  / Bili: Negative / Urobili: 0.2 mg/dL   Blood: x / Protein: Negative mg/dL / Nitrite: Negative   Leuk Esterase: Negative / RBC: x / WBC x   Sq Epi: x / Non Sq Epi: x / Bacteria: x            ____________________________________________  MICRO:  RECENT CULTURES:    ____________________________________________  RADIOLOGY:

## 2025-04-08 NOTE — ED PROVIDER NOTE - ATTENDING CONTRIBUTION TO CARE
Patient PTED for evaluation of post abdominoplasty wound seen by plastics await reccs pt clinically stable and in no distress will reassess after labs and CT ab/pelvis; of note patient with h/o aneurysm and HA; low suspicion but will scan head for surveillance   Dispo as per response/course results and final reccs

## 2025-04-08 NOTE — ED PROVIDER NOTE - PROGRESS NOTE DETAILS
Dave TAYLOR), PGY-2: plastic surgery fellow paged for consult. Dave TAYLOR), PGY-2: spoke with plastics, they will see pt in the ED. Dave TAYLOR), PGY-2: Spoke with plastics after they evaluated patient in the ER, plastics resident spoke with the attending surgeon Dr. Wen, sent images of patient surgical wound to her, attending surgeon states that patient's wound today looks improved from 2 days ago when she was seen in the office, plastics recommends CDU observation for 24 hours of IV antibiotics for reevaluation tomorrow for clinical improvement.  Given CT abdo and head imaging pending at this time, will wait until imaging is performed and then place in CDU.

## 2025-04-08 NOTE — ED CDU PROVIDER INITIAL DAY NOTE - PHYSICAL EXAMINATION
CONSTITUTIONAL: Well-appearing; well-nourished; in no apparent distress. Non-toxic appearing.   NEURO: Alert & oriented. Gait steady without assistance. Sensory and motor functions are grossly intact.  PSYCH: Mood appropriate. Thought processes intact.   NECK: Supple  CARD: Well perfused.   RESP: Breathing unlabored, speaking full sentences.   ABD: Soft, non-distended. To the lower abdomen there is a healing surgical wound with increased erythema with packing in place centrally appears to have serosanguineous drainage on gauze. There is a small area of dehiscence to left side of suture line without active drainage. Remainder of abdomen non-tender.   MUSCULOSKELETAL/EXTREMITIES: FROM in all four extremities; no extremity edema.  SKIN: As noted above.

## 2025-04-08 NOTE — ED PROVIDER NOTE - OBJECTIVE STATEMENT
54-year-old female past medical history hyperlipidemia, hypothyroidism, rheumatoid arthritis, presenting for postsurgical infection.  Patient on March 18, 2025 with Dr. Wen underwent abdominoplasty with liposuction over the past approximately 1 to 2 weeks has had increasing redness and discomfort to the area, now having fevers with Tmax 104.  Initially went to an urgent care who gave her Bactrim for skin infection to the area, followed up with her surgeon yesterday and was told that she may need IV antibiotics if the infection continues to get worse.  Today took a Percocet for her fever and pain, came to the emergency department.  No vomiting, nausea, stool changes, urinary complaints.  Does endorse a few days of a headache which she says initial onset was 10 out of 10 headache and different from prior headaches in the past, now waxing and waning, is concerned that she has an aneurysmal bleed considering that her mother had an aneurysmal bleed around the same age as her.

## 2025-04-08 NOTE — ED PROVIDER NOTE - CLINICAL SUMMARY MEDICAL DECISION MAKING FREE TEXT BOX
54-year-old female past medical history hyperlipidemia, hypothyroidism, rheumatoid arthritis, presenting for postsurgical infection. With initial vital signs with tachycardia to 115 otherwise within normal limits.  Presenting fevers and abdominal pain status post abdominoplasty with infectious findings to the area, concerning for postoperative infection, concerning for intra-abdominal infection given procedure evaluate labs, CT, reassess.  With headache, lower suspicion for aneurysmal etiology given waxing waning quality without Maximal onset however given history and abnormal quality to evaluate CT.

## 2025-04-08 NOTE — CONSULT NOTE ADULT - ASSESSMENT
ASSESSMENT/PLAN:   MARIIA CHENG is a 54yFemale s/p abdominoplasty w/ liposuction of chin, abdomen, thighs, and excision of b/l breast scars on 03/18/25, here with mild drainage from her wounds with erythema and associated subjective fever.       - Wound care: midline opening packed with sterile gauze and covered w/ gauze and paper tape  - Diet: NPO for now   - Vanc/Zosyn  - F/U Blood Cx   - F/U CT A/P   - Pain control PRN   - Activity as tolerated  - SCDs     Final recs pending  ASSESSMENT/PLAN:   MARIIA CHENG is a 54yFemale s/p abdominoplasty w/ liposuction of chin, abdomen, thighs, and excision of b/l breast scars on 03/18/25, here with mild drainage from her wounds with erythema and associated subjective fever.       - Wound care: midline opening packed with sterile gauze and covered w/ gauze and paper tape  - Vanc/Zosyn  - F/U Blood Cx   - F/U CT A/P   - Pain control PRN   - Activity as tolerated  - SCDs   - Recommend CDU w/ IV antibiotics for monitoring of symptoms

## 2025-04-08 NOTE — ED CDU PROVIDER INITIAL DAY NOTE - CLINICAL SUMMARY MEDICAL DECISION MAKING FREE TEXT BOX
54-year-old female past medical history hyperlipidemia, hypothyroidism, rheumatoid arthritis, presenting for postsurgical infection. With initial vital signs with tachycardia to 115 otherwise within normal limits.  Presenting fevers and abdominal pain status post abdominoplasty with infectious findings to the area, concerning for postoperative infection, concerning for intra-abdominal infection given procedure evaluate labs, CT, reassess.  With headache, lower suspicion for aneurysmal etiology given waxing waning quality without Maximal onset however given history and abnormal quality to evaluate CT.    Pt was seen by plastics who discussed plan with Pt's surgeon.   Plan to Obs in CDU for continued IV antibiotic and reeval by plastics in AM to determine further management. 54-year-old female past medical history hyperlipidemia, hypothyroidism, rheumatoid arthritis, presenting for postsurgical infection. With initial vital signs with tachycardia to 115 otherwise within normal limits.  Presenting w/ fevers (x 4 days) and abdominal pain status post abdominoplasty with infectious findings to the area, concerning for postoperative infection, concerning for intra-abdominal infection. CT w/o evidence of abscess.  With headache, lower suspicion for aneurysmal etiology given waxing waning quality without Maximal onset however given history and abnormal quality to evaluate CTA negative.  Pt was seen by plastics who discussed plan with Pt's surgeon.   Plan to Obs in CDU for continued IV antibiotic and re-eval by plastics in AM to determine further management.

## 2025-04-08 NOTE — ED CDU PROVIDER INITIAL DAY NOTE - OBJECTIVE STATEMENT
54F Hx HLD, hypothyroidism, RA and repeat abdominoplasty s/p abdominoplasty w/ liposuction of chin, abdomen, thighs, and excision of b/l breast scars on 03/18/25, presents with complaints of 5+ days of fever, chills, nausea, decreased appetite, headaches, and drainage from her surgical sites. States she was in her usual state of health until the last abdominal drain was removed about a week ago. Reports her symptoms are improved w/ tylenol. Went to urgent care on 04/06 and was started on bactrim and per patient she then saw her surgeon 04/07 for the drainage from her wounds where the midline opening was packed. Reports the output has consistently been a yellowish-brown colors, somewhat cloudy, but denies chinmay pus. Noted increased redness around the midline over the past two days. Reports her fevers reached as high as 103F at home. Denies any lightheadedness, palpitations, syncopal episodes, pain around her incisions, dysuria, or diarrhea.

## 2025-04-08 NOTE — ED CDU PROVIDER INITIAL DAY NOTE - ATTENDING CONTRIBUTION TO CARE
I, Case Mock DO,  performed the initial face to face bedside interview with this patient regarding history of present illness, review of symptoms and relevant past medical, social and family history.  I completed an independent physical examination.  I was the initial provider who evaluated this patient. I have signed out the follow up of any pending tests (i.e. labs, radiological studies) to the resident.  I have communicated the patient’s plan of care and disposition with the resident.  The history, relevant review of systems, past medical and surgical history, medical decision making, and physical examination was documented by the scribe in my presence and I attest to the accuracy of the documentation.

## 2025-04-09 VITALS
TEMPERATURE: 97 F | SYSTOLIC BLOOD PRESSURE: 98 MMHG | DIASTOLIC BLOOD PRESSURE: 60 MMHG | OXYGEN SATURATION: 98 % | RESPIRATION RATE: 16 BRPM | HEART RATE: 69 BPM

## 2025-04-09 PROCEDURE — 99239 HOSP IP/OBS DSCHRG MGMT >30: CPT

## 2025-04-09 RX ADMIN — Medication 975 MILLIGRAM(S): at 05:15

## 2025-04-09 RX ADMIN — Medication 25 GRAM(S): at 06:20

## 2025-04-09 RX ADMIN — Medication 250 MILLIGRAM(S): at 04:11

## 2025-04-09 NOTE — ED CDU PROVIDER SUBSEQUENT DAY NOTE - NS ED ATTENDING STATEMENT MOD
This was a shared visit with the FLAVIO. I reviewed and verified the documentation. What Type Of Note Output Would You Prefer (Optional)?: Standard Output Have Your Spot(S) Been Treated In The Past?: has not been treated Hpi Title: Evaluation of Skin Lesions

## 2025-04-09 NOTE — PROGRESS NOTE ADULT - SUBJECTIVE AND OBJECTIVE BOX
Patient stable, tolerated diet, pain controlled on regimen.  Improved and feeling much better overall. Wanting to go home today    T(C): 37.2 (04-09-25 @ 05:28), Max: 37.3 (04-08-25 @ 15:05)  HR: 81 (04-09-25 @ 05:28) (72 - 115)  BP: 99/65 (04-09-25 @ 01:17) (94/63 - 130/69)  RR: 16 (04-09-25 @ 05:28) (16 - 18)  SpO2: 98% (04-09-25 @ 05:28) (94% - 98%)  General: NAD  Neuro: Alert and oriented, no focal deficits, moves all extremities spontaneously  HEENT: NCAT, EOMI, anicteric, mucosa moist  Respiratory: Airway patent, respirations unlabored  CVS: Regular rate and rhythm  Abdomen: Soft, nontender, nondistended. Small 1cm opening below the umbilicus over surgical site, no fluctuance or crepitus or purulence. Large area of cellulitis surrounds this area a few cm laterally on both sides and extended from below umbilicus to the pubic symphysis, improved from yesterday.   Extremities: No edema, sensation and movement grossly intact        ASSESSMENT/PLAN:   MARIIA CHENG is a 54yFemale s/p abdominoplasty w/ liposuction of chin, abdomen, thighs, and excision of b/l breast scars on 03/18/25, here with mild drainage from her wounds with erythema and associated subjective fever.       - Wound care: midline opening packed with sterile gauze and covered w/ gauze and paper tape  - Vanc/Zosyn  - F/U CT A/P - no loculations / abscess  - Pain control PRN   - Activity as tolerated  - SCDs   - D/C home on PO bactrim DS  - FU outpatient Friday    [phone]

## 2025-04-09 NOTE — ED CDU PROVIDER DISPOSITION NOTE - PATIENT PORTAL LINK FT
You can access the FollowMyHealth Patient Portal offered by [facility] by registering at the following website: [email or web address]. By joining [facility]’s FollowMyHealth portal, you will also be able to view your health information using other applications (apps) compatible with our system.

## 2025-04-09 NOTE — ED CDU PROVIDER DISPOSITION NOTE - CARE PROVIDER_API CALL
Natalia Wen  Plastic Surgery  [address]  [place] [address]  Phone: [phone]  Fax: [phone]  Follow Up Time:

## 2025-04-09 NOTE — ED CDU PROVIDER DISPOSITION NOTE - ATTENDING CONTRIBUTION TO CARE
Call placed to patient. Name and date of birth verified. Patient informed of the following:    Please notify his T is still low.  Verify he used androgel correctly and on the day of his draw.  If so, increase to 3 pumps/day.  Recheck T in 1 week.  -Dr. Abdi    Patient states he applies 2 pumps to shoulder daily and prior to lab draw. Patient instructed to increase application to 3 pumps to shoulders daily and reminded to apply prior to lab draw. Patient lab appointment scheduled and noted in epic. Patient informed appointment details are noted in portal. Patient verbalized understanding.    54-year-old female with past medical history of hyperlipidemia, hypothyroidism presented to the emergency department status post abdominoplasty on 3/18 by Dr. Wen complaining of fevers, chills and drainage from surgical site.  Patient placed in CDU for IV antibiotics with plastics following.  Patient seen and cleared by plastic surgeon this AM Dr. Wen advised patient okay for discharge home to continue Bactrim as previously prescribed and to follow-up in the office on Friday.  Patient feels improved, tolerating p.o., ambulatory without difficulty, patient wants to go home.  Discharge and results reviewed with patient.  Pt is alert and oriented. The pt understands the illness, suggested treatment and risks and benefits. They understand the risk including permanent disability, or death. The pt is acting rationally and does not want our suggested treatment. They were instructed to f/u with their pmd immediately and instructed to return with any concerns.   pt understands that severe krystle Is life threatening, offered pacemaker by cardio. pt instructed to return if symptoms worsen, hd stable at this point. 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 54-year-old female with past medical history of hyperlipidemia, hypothyroidism presented to the emergency department status post abdominoplasty on 3/18 by Dr. Wen complaining of fevers, chills and drainage from surgical site.  Patient placed in CDU for IV antibiotics with plastics following.  Patient seen and cleared by plastic surgeon this AM Dr. Wen advised patient okay for discharge home to continue Bactrim as previously prescribed and to follow-up in the office on Friday.  Patient feels improved, tolerating p.o., ambulatory without difficulty, patient wants to go home.  Discharge and results reviewed with patient.

## 2025-04-09 NOTE — ED CDU PROVIDER SUBSEQUENT DAY NOTE - PROGRESS NOTE DETAILS
DANETTE Irving: 54-year-old female with past medical history of hyperlipidemia, hypothyroidism presented to the emergency department status post abdominoplasty on 3/18 by Dr. Wen complaining of fevers, chills and drainage from surgical site.  Patient placed in CDU for IV antibiotics with plastics following.  Patient seen and cleared by plastic surgeon this AM Dr. Wen advised patient okay for discharge home to continue Bactrim as previously prescribed and to follow-up in the office on Friday.  Patient feels improved, tolerating p.o., ambulatory without difficulty, patient wants to go home.  Discharge and results reviewed with patient.

## 2025-04-09 NOTE — ED CDU PROVIDER SUBSEQUENT DAY NOTE - HISTORY
54-year-old female past medical history hyperlipidemia, hypothyroidism, rheumatoid arthritis, presenting for postsurgical infection. With initial vital signs with tachycardia to 115 otherwise within normal limits.  Presenting w/ fevers (x 4 days) and abdominal pain status post abdominoplasty with infectious findings to the area, concerning for postoperative infection, concerning for intra-abdominal infection. CT w/o evidence of abscess.  With headache, lower suspicion for aneurysmal etiology given waxing waning quality without Maximal onset however given history and abnormal quality to evaluate CTA negative.  Pt was seen by plastics who discussed plan with Pt's surgeon.   Plan to Obs in CDU for continued IV antibiotic and re-eval by plastics in AM to determine further management.    Interval hx- VSS, pt resting in no distress, will monitor.

## 2025-04-09 NOTE — ED CDU PROVIDER SUBSEQUENT DAY NOTE - NSICDXPASTSURGICALHX_GEN_ALL_CORE_FT
PAST SURGICAL HISTORY:  H/O knee surgery     H/O laparoscopic adjustable gastric banding     History of removal of laparoscopic gastric banding device     
[[id number]]

## 2025-04-09 NOTE — PROGRESS NOTE ADULT - SUBJECTIVE AND OBJECTIVE BOX
Plastic Surgery Progress Note (pg [facility]: [id number], [place]: [phone])    SUBJECTIVE  The patient was seen and examined.     OBJECTIVE  ___________________________________________________  VITAL SIGNS / I&O's   Vital Signs Last 24 Hrs  T(C): 37.2 (2025 05:28), Max: 37.3 (2025 15:05)  T(F): 99 (2025 05:28), Max: 99.2 (2025 15:05)  HR: 81 (2025 05:28) (72 - 115)  BP: 99/65 (2025 01:17) (94/63 - 130/69)  BP(mean): 73 (2025 21:09) (73 - 73)  RR: 16 (2025 05:28) (16 - 18)  SpO2: 98% (2025 05:28) (94% - 98%)    Parameters below as of 2025 01:17  Patient On (Oxygen Delivery Method): room air          ___________________________________________________  PHYSICAL EXAM    General: Well developed, well nourished, NAD  Neuro: Alert and oriented, no focal deficits, moves all extremities spontaneously  HEENT: NCAT, EOMI, anicteric, mucosa moist  Respiratory: Airway patent, respirations unlabored  CVS: Regular rate and rhythm  Abdomen: Soft, nontender, nondistended. Small 1cm opening below the umbilicus over surgical site w/ expressible sero-purulent fluid, no fluctuance or crepitus. Large area of cellulitis surrounds this area a few cm laterally on both sides and extended from below umbilicus to the pubic symphysis. Small area of cellulitis over the L pelvis surgical site where previous drain was w/ no expressible drainage, fluctuance, or crepitus.   Extremities: No edema, sensation and movement grossly intact    ___________________________________________________  LABS                        9.9    5.47  )-----------( 222      ( 2025 14:30 )             30.1     2025 14:30    136    |  99     |  16     ----------------------------<  112    3.6     |  24     |  0.65     Ca    9.4        2025 14:30    TPro  6.5    /  Alb  3.3    /  TBili  0.3    /  DBili  x      /  AST  56     /  ALT  58     /  AlkPhos  217    2025 14:30    PT/INR - ( 2025 14:30 )   PT: 12.9 sec;   INR: 1.08 ratio         PTT - ( 2025 14:30 )  PTT:31.1 sec  CAPILLARY BLOOD GLUCOSE            Urinalysis Basic - ( 2025 14:59 )    Color: Yellow / Appearance: Clear / S.010 / pH: x  Gluc: x / Ketone: Negative mg/dL  / Bili: Negative / Urobili: 0.2 mg/dL   Blood: x / Protein: Negative mg/dL / Nitrite: Negative   Leuk Esterase: Negative / RBC: x / WBC x   Sq Epi: x / Non Sq Epi: x / Bacteria: x      ___________________________________________________  MICRO  Recent Cultures:    ___________________________________________________  MEDICATIONS  (STANDING):  piperacillin/tazobactam IVPB.. 3.375 Gram(s) IV Intermittent every 8 hours  vancomycin  IVPB 1000 milliGRAM(s) IV Intermittent every 12 hours    MEDICATIONS  (PRN):  acetaminophen     Tablet .. 975 milliGRAM(s) Oral every 6 hours PRN Temp greater or equal to 38C (100.4F), Mild Pain (1 - 3)  ketorolac   Injectable 15 milliGRAM(s) IV Push every 6 hours PRN Moderate Pain (4 - 6)

## 2025-04-09 NOTE — ED CDU PROVIDER DISPOSITION NOTE - NSFOLLOWUPINSTRUCTIONS_ED_ALL_ED_FT
Follow up with your Primary Medical Doctor in 1-2 days.  Follow up with your Plastic Surgeon Dr Wen on Friday as discussed.  Continue to take your antibiotic Bactrim as prescribed by your Doctor.  Rest.  Stay well hydrated.  Return to the ER for any persistent/worsening or new symptoms fevers, chills, pain, discharge, nausea, vomiting or any concerning symptoms.

## 2025-04-09 NOTE — PROGRESS NOTE ADULT - ASSESSMENT
ASSESSMENT/PLAN:   MARIIA CHENG is a 54yFemale s/p abdominoplasty w/ liposuction of chin, abdomen, thighs, and excision of b/l breast scars on 03/18/25, here with mild drainage from her wounds with erythema and associated subjective fever.       - Wound care: midline opening packed with sterile gauze and covered w/ gauze and paper tape  - Vanc/Zosyn  - F/U Blood Cx - pending  - F/U CT A/P - no loculations / abscess  - Pain control PRN   - Activity as tolerated  - SCDs   - Recommend CDU w/ IV antibiotics for monitoring of symptoms     - disposition pending discussion with Dr. Wen    PRS

## 2025-04-09 NOTE — ED CDU PROVIDER SUBSEQUENT DAY NOTE - CLINICAL SUMMARY MEDICAL DECISION MAKING FREE TEXT BOX
54-year-old female past medical history hyperlipidemia, hypothyroidism, rheumatoid arthritis, presenting for postsurgical infection. With initial vital signs with tachycardia to 115 otherwise within normal limits.  Presenting w/ fevers (x 4 days) and abdominal pain status post abdominoplasty with infectious findings to the area, concerning for postoperative infection, concerning for intra-abdominal infection. CT w/o evidence of abscess.  With headache, lower suspicion for aneurysmal etiology given waxing waning quality without Maximal onset however given history and abnormal quality to evaluate CTA negative.  Pt was seen by plastics who discussed plan with Pt's surgeon.   Plan to Obs in CDU for continued IV antibiotic and re-eval by plastics in AM to determine further management.

## 2025-04-13 LAB
CULTURE RESULTS: SIGNIFICANT CHANGE UP
CULTURE RESULTS: SIGNIFICANT CHANGE UP
SPECIMEN SOURCE: SIGNIFICANT CHANGE UP
SPECIMEN SOURCE: SIGNIFICANT CHANGE UP

## 2025-04-29 ENCOUNTER — OFFICE (OUTPATIENT)
Dept: URBAN - METROPOLITAN AREA CLINIC 103 | Facility: CLINIC | Age: 55
Setting detail: OPHTHALMOLOGY
End: 2025-04-29
Payer: COMMERCIAL

## 2025-04-29 DIAGNOSIS — H10.433: ICD-10-CM

## 2025-04-29 DIAGNOSIS — H52.4: ICD-10-CM

## 2025-04-29 PROBLEM — H52.7 REFRACTIVE ERROR: Status: ACTIVE | Noted: 2025-04-29

## 2025-04-29 PROCEDURE — 92015 DETERMINE REFRACTIVE STATE: CPT | Performed by: OPHTHALMOLOGY

## 2025-04-29 PROCEDURE — 92002 INTRM OPH EXAM NEW PATIENT: CPT | Performed by: OPHTHALMOLOGY

## 2025-04-29 ASSESSMENT — REFRACTION_MANIFEST
OD_AXIS: 095
OD_SPHERE: PLANO
OS_SPHERE: +0.50
OD_ADD: +2.25
OD_CYLINDER: -0.25
OS_CYLINDER: SPHERE
OS_ADD: +2.25
OS_VA1: 20/20
OD_VA1: 20/20

## 2025-04-29 ASSESSMENT — VISUAL ACUITY
OS_BCVA: 20/20
OD_BCVA: 20/25-

## 2025-04-29 ASSESSMENT — CONFRONTATIONAL VISUAL FIELD TEST (CVF)
OS_FINDINGS: FULL
OD_FINDINGS: FULL

## 2025-04-29 ASSESSMENT — KERATOMETRY
OD_K1POWER_DIOPTERS: 47.50
OD_K2POWER_DIOPTERS: 48.50
OS_K1POWER_DIOPTERS: 48.00
OS_AXISANGLE_DEGREES: 147
OS_K2POWER_DIOPTERS: 48.25
OD_AXISANGLE_DEGREES: 097

## 2025-04-29 ASSESSMENT — REFRACTION_AUTOREFRACTION
OS_SPHERE: +0.75
OD_AXIS: 088
OS_AXIS: 101
OD_CYLINDER: -0.75
OS_CYLINDER: -0.75
OD_SPHERE: +0.25

## 2025-04-29 ASSESSMENT — TONOMETRY
OD_IOP_MMHG: 18
OS_IOP_MMHG: 19

## (undated) DEVICE — SUT ETHILON 5-0 18" PS-2

## (undated) DEVICE — POSITIONER FOAM EGG CRATE ULNAR 2PCS (PINK)

## (undated) DEVICE — SOL IRR POUR NS 0.9% 500ML

## (undated) DEVICE — TUBING INFILTRATION

## (undated) DEVICE — PACK MINOR NO DRAPE

## (undated) DEVICE — STAPLER SKIN INSORB

## (undated) DEVICE — TUBING LIPOSUCTION HI-VAC PSI-TEC

## (undated) DEVICE — WARMING BLANKET FULL UNDERBODY

## (undated) DEVICE — VENODYNE/SCD SLEEVE CALF MEDIUM

## (undated) DEVICE — DRAPE TOWEL BLUE 17" X 24"

## (undated) DEVICE — POSITIONER PATIENT SAFETY STRAP 3X60"

## (undated) DEVICE — GLV 7.5 PROTEXIS (WHITE)

## (undated) DEVICE — SOL INJ LR 1000ML